# Patient Record
Sex: MALE | Race: WHITE | Employment: FULL TIME | URBAN - METROPOLITAN AREA
[De-identification: names, ages, dates, MRNs, and addresses within clinical notes are randomized per-mention and may not be internally consistent; named-entity substitution may affect disease eponyms.]

---

## 2017-07-20 ENCOUNTER — HOSPITAL ENCOUNTER (OUTPATIENT)
Age: 42
Discharge: HOME | End: 2017-07-20
Attending: PEDIATRICS
Payer: COMMERCIAL

## 2017-07-20 VITALS
TEMPERATURE: 99 F | SYSTOLIC BLOOD PRESSURE: 149 MMHG | DIASTOLIC BLOOD PRESSURE: 92 MMHG | BODY MASS INDEX: 23.7 KG/M2 | RESPIRATION RATE: 16 BRPM | HEART RATE: 107 BPM | WEIGHT: 175 LBS | HEIGHT: 72 IN | OXYGEN SATURATION: 100 %

## 2017-07-20 DIAGNOSIS — J06.9 UPPER RESPIRATORY TRACT INFECTION, UNSPECIFIED TYPE: Primary | ICD-10-CM

## 2017-07-20 LAB — S PYO AG THROAT QL: NEGATIVE

## 2017-07-20 PROCEDURE — 99203 OFFICE O/P NEW LOW 30 MIN: CPT

## 2017-07-20 PROCEDURE — 87430 STREP A AG IA: CPT

## 2017-07-20 RX ORDER — CODEINE PHOSPHATE AND GUAIFENESIN 10; 100 MG/5ML; MG/5ML
5 SOLUTION ORAL EVERY 6 HOURS PRN
Qty: 120 ML | Refills: 0 | Status: SHIPPED | OUTPATIENT
Start: 2017-07-20

## 2017-07-20 NOTE — ED INITIAL ASSESSMENT (HPI)
Cough, fever, chills, body aches, headaches, abdominal pain x 4 days. Children had the same symptoms.

## 2017-07-20 NOTE — ED PROVIDER NOTES
Patient presents with:  Cough/URI      HPI:     Reinaldo Murry is a 38year old male who presents for evaluation of a chief complaint of URI symptoms for 4 days. These include cough, congestion, sore throat, fevers, body aches. Positive sick contacts.   He medicine trial discussed. All results reviewed and discussed with patient. See AVS for detailed discharge instructions for your condition today. Follow Up with:  [facility] in [place] [address]

## 2024-12-24 ENCOUNTER — APPOINTMENT (OUTPATIENT)
Dept: CT IMAGING | Facility: HOSPITAL | Age: 49
End: 2024-12-24
Attending: EMERGENCY MEDICINE
Payer: COMMERCIAL

## 2024-12-24 ENCOUNTER — HOSPITAL ENCOUNTER (EMERGENCY)
Facility: HOSPITAL | Age: 49
Discharge: HOME | End: 2024-12-24
Attending: EMERGENCY MEDICINE
Payer: COMMERCIAL

## 2024-12-24 VITALS
OXYGEN SATURATION: 100 % | HEIGHT: 72 IN | DIASTOLIC BLOOD PRESSURE: 94 MMHG | WEIGHT: 175 LBS | RESPIRATION RATE: 15 BRPM | SYSTOLIC BLOOD PRESSURE: 142 MMHG | HEART RATE: 87 BPM | TEMPERATURE: 97 F | BODY MASS INDEX: 23.7 KG/M2

## 2024-12-24 DIAGNOSIS — N20.0 KIDNEY STONE: Primary | ICD-10-CM

## 2024-12-24 LAB
ALBUMIN SERPL-MCNC: 5.2 G/DL (ref 3.2–4.8)
ALBUMIN/GLOB SERPL: 2.3 {RATIO} (ref 1–2)
ALP LIVER SERPL-CCNC: 90 U/L
ALT SERPL-CCNC: 64 U/L
ANION GAP SERPL CALC-SCNC: 9 MMOL/L (ref 0–18)
AST SERPL-CCNC: 32 U/L (ref ?–34)
BASOPHILS # BLD AUTO: 0.05 X10(3) UL (ref 0–0.2)
BASOPHILS NFR BLD AUTO: 0.6 %
BILIRUB SERPL-MCNC: 0.8 MG/DL (ref 0.3–1.2)
BILIRUB UR QL: NEGATIVE
BUN BLD-MCNC: 11 MG/DL (ref 9–23)
BUN/CREAT SERPL: 10.6 (ref 10–20)
CALCIUM BLD-MCNC: 10.1 MG/DL (ref 8.7–10.4)
CHLORIDE SERPL-SCNC: 103 MMOL/L (ref 98–112)
CLARITY UR: CLEAR
CO2 SERPL-SCNC: 26 MMOL/L (ref 21–32)
CREAT BLD-MCNC: 1.04 MG/DL
DEPRECATED RDW RBC AUTO: 44.7 FL (ref 35.1–46.3)
EGFRCR SERPLBLD CKD-EPI 2021: 87 ML/MIN/1.73M2 (ref 60–?)
EOSINOPHIL # BLD AUTO: 0.15 X10(3) UL (ref 0–0.7)
EOSINOPHIL NFR BLD AUTO: 1.7 %
ERYTHROCYTE [DISTWIDTH] IN BLOOD BY AUTOMATED COUNT: 13.2 % (ref 11–15)
GLOBULIN PLAS-MCNC: 2.3 G/DL (ref 2–3.5)
GLUCOSE BLD-MCNC: 131 MG/DL (ref 70–99)
GLUCOSE UR-MCNC: NORMAL MG/DL
HCT VFR BLD AUTO: 44.5 %
HGB BLD-MCNC: 15.3 G/DL
IMM GRANULOCYTES # BLD AUTO: 0.05 X10(3) UL (ref 0–1)
IMM GRANULOCYTES NFR BLD: 0.6 %
KETONES UR-MCNC: NEGATIVE MG/DL
LEUKOCYTE ESTERASE UR QL STRIP.AUTO: NEGATIVE
LYMPHOCYTES # BLD AUTO: 1.78 X10(3) UL (ref 1–4)
LYMPHOCYTES NFR BLD AUTO: 20.5 %
MCH RBC QN AUTO: 31.3 PG (ref 26–34)
MCHC RBC AUTO-ENTMCNC: 34.4 G/DL (ref 31–37)
MCV RBC AUTO: 91 FL
MONOCYTES # BLD AUTO: 0.65 X10(3) UL (ref 0.1–1)
MONOCYTES NFR BLD AUTO: 7.5 %
NEUTROPHILS # BLD AUTO: 6.02 X10 (3) UL (ref 1.5–7.7)
NEUTROPHILS # BLD AUTO: 6.02 X10(3) UL (ref 1.5–7.7)
NEUTROPHILS NFR BLD AUTO: 69.1 %
NITRITE UR QL STRIP.AUTO: NEGATIVE
OSMOLALITY SERPL CALC.SUM OF ELEC: 287 MOSM/KG (ref 275–295)
PH UR: 6.5 [PH] (ref 5–8)
PLATELET # BLD AUTO: 233 10(3)UL (ref 150–450)
POTASSIUM SERPL-SCNC: 4 MMOL/L (ref 3.5–5.1)
PROT SERPL-MCNC: 7.5 G/DL (ref 5.7–8.2)
PROT UR-MCNC: 20 MG/DL
RBC # BLD AUTO: 4.89 X10(6)UL
RBC #/AREA URNS AUTO: >10 /HPF
SODIUM SERPL-SCNC: 138 MMOL/L (ref 136–145)
SP GR UR STRIP: 1.01 (ref 1–1.03)
UROBILINOGEN UR STRIP-ACNC: NORMAL
WBC # BLD AUTO: 8.7 X10(3) UL (ref 4–11)

## 2024-12-24 PROCEDURE — 96374 THER/PROPH/DIAG INJ IV PUSH: CPT

## 2024-12-24 PROCEDURE — 74176 CT ABD & PELVIS W/O CONTRAST: CPT | Performed by: EMERGENCY MEDICINE

## 2024-12-24 PROCEDURE — 96361 HYDRATE IV INFUSION ADD-ON: CPT

## 2024-12-24 PROCEDURE — 85025 COMPLETE CBC W/AUTO DIFF WBC: CPT | Performed by: EMERGENCY MEDICINE

## 2024-12-24 PROCEDURE — 99285 EMERGENCY DEPT VISIT HI MDM: CPT

## 2024-12-24 PROCEDURE — 99284 EMERGENCY DEPT VISIT MOD MDM: CPT

## 2024-12-24 PROCEDURE — 80053 COMPREHEN METABOLIC PANEL: CPT | Performed by: EMERGENCY MEDICINE

## 2024-12-24 PROCEDURE — 81001 URINALYSIS AUTO W/SCOPE: CPT | Performed by: EMERGENCY MEDICINE

## 2024-12-24 RX ORDER — HYDROCODONE BITARTRATE AND ACETAMINOPHEN 5; 325 MG/1; MG/1
1 TABLET ORAL EVERY 6 HOURS PRN
Qty: 16 TABLET | Refills: 0 | Status: SHIPPED | OUTPATIENT
Start: 2024-12-24 | End: 2024-12-31

## 2024-12-24 RX ORDER — KETOROLAC TROMETHAMINE 15 MG/ML
15 INJECTION, SOLUTION INTRAMUSCULAR; INTRAVENOUS ONCE
Status: COMPLETED | OUTPATIENT
Start: 2024-12-24 | End: 2024-12-24

## 2024-12-24 RX ORDER — TAMSULOSIN HYDROCHLORIDE 0.4 MG/1
0.4 CAPSULE ORAL DAILY
Qty: 14 CAPSULE | Refills: 0 | Status: SHIPPED | OUTPATIENT
Start: 2024-12-24 | End: 2025-01-07

## 2024-12-24 RX ORDER — ONDANSETRON 4 MG/1
4 TABLET, ORALLY DISINTEGRATING ORAL EVERY 4 HOURS PRN
Qty: 10 TABLET | Refills: 0 | Status: SHIPPED | OUTPATIENT
Start: 2024-12-24 | End: 2024-12-31

## 2024-12-24 RX ORDER — KETOROLAC TROMETHAMINE 10 MG/1
10 TABLET, FILM COATED ORAL EVERY 6 HOURS PRN
Qty: 25 TABLET | Refills: 0 | Status: SHIPPED | OUTPATIENT
Start: 2024-12-24 | End: 2024-12-31

## 2024-12-24 NOTE — ED QUICK NOTES
Pt c/o dull right sided back pain that radiates to front since last night. States woke him from his sleep- some relief with ibuprofen. Denies burning or pain with urination, +hematuria.

## 2024-12-24 NOTE — ED INITIAL ASSESSMENT (HPI)
Pt presents to ED with atraumatic right sided flank/lower back pain starting yesterday + hematuria. Denies known fevers or n/v

## 2024-12-24 NOTE — ED PROVIDER NOTES
Patient Seen in: [facility] Emergency Department    History     Chief Complaint   Patient presents with    Abdomen/Flank Pain       HPI    49-year-old male presents to the ED for evaluation of right flank pain that started early this morning followed by hematuria.  Patient states the pain was severe initially but now it is a very mild dull ache.  He had some nausea but no vomiting.  Patient states he felt fine going to bed last night.  No other exacerbating or relieving factors    History from Independent Source:       External Records Reviewed: On chart review, patient had his last primary care visit in August.  He is being treated for general anxiety, IBS, sleep apnea.  He had recent cataract surgery of 21 November    History reviewed. History reviewed. No pertinent past medical history.    History reviewed. History reviewed. No pertinent surgical history.      Medications :  Prescriptions Prior to Admission[1]     No family history on file.    Smoking Status:   Social History     Socioeconomic History    Marital status:    Tobacco Use    Smoking status: Never    Smokeless tobacco: Never   Substance and Sexual Activity    Alcohol use: Not Currently    Drug use: Not Currently       Constitutional and vital signs reviewed.      Social History and Family History elements reviewed from today, pertinent positives to the presenting problem noted.    Physical Exam     ED Triage Vitals [12/24/24 0938]   BP (!) 156/92   Pulse 99   Resp 15   Temp 96.8 °F (36 °C)   Temp src    SpO2 99 %   O2 Device None (Room air)       Physical Exam   Constitutional: AAOx3, well nourished, NAD  HEENT: Normocephalic, PERRLA, MMM  CV: s1s2+, RRR, no m/r/g, normal distal pulses  Pulmonary/Chest: CTA b/l with no rales, wheezes.  No chest wall tenderness  Abdominal: Nontender.  Nondistended. Soft. Bowel sounds are normal.   Neck/Back: No CVA tenderness percussion  :   Musculoskeletal: Normal range of motion. No deformity.    Neurological: Awake, alert. Normal reflexes. No cranial nerve deficit.    Skin: Skin is warm and dry. No rash noted. No erythema.   Psychiatric:      All measures to prevent infection transmission during my interaction with the patient were taken. The patient was already wearing a droplet mask on my arrival to the room. Personal protective equipment was worn throughout the duration of the exam.      ED Course        Labs Reviewed   URINALYSIS WITH CULTURE REFLEX - Abnormal; Notable for the following components:       Result Value    Blood Urine 3+ (*)     Protein Urine 20 (*)     RBC Urine >10 (*)     Bacteria Urine 1+ (*)     All other components within normal limits   COMP METABOLIC PANEL (14) - Abnormal; Notable for the following components:    Glucose 131 (*)     ALT 64 (*)     Albumin 5.2 (*)     A/G Ratio 2.3 (*)     All other components within normal limits   CBC WITH DIFFERENTIAL WITH PLATELET     My Independent Interpretation of EKG (if performed):     Imaging Results Available and Reviewed while in ED: CT ABDOMEN+PELVIS KIDNEYSTONE 2D RNDR(NO IV,NO ORAL)(CPT=74176)    Result Date: 12/24/2024  CONCLUSION:   1. Obstructing 6 mm calculus in the right mid ureter with resultant mild to moderate upstream right hydroureteronephrosis and mild right periureteral/perinephric fat stranding. 2. No bowel obstruction, acute appendicitis, abnormal bowel wall thickening, or acute diverticulitis. 3. Prostatomegaly. 4. Hepatic steatosis. 5. Lesser incidental findings described above.     Dictated by (CST): Tahir Rosa MD on 12/24/2024 at 10:53 AM     Finalized by (CST): Tahir Rosa MD on 12/24/2024 at 10:58 AM         ED Medications Administered:   Medications   ketorolac (Toradol) 15 MG/ML injection 15 mg (has no administration in time range)   sodium chloride 0.9 % IV bolus 1,000 mL (1,000 mL Intravenous New Bag 12/24/24 1034)             MDM     Vitals:    12/24/24 0938 12/24/24 1030   BP: (!) 156/92 134/86    Pulse: 99 97   Resp: 15    Temp: 96.8 °F (36 °C)    SpO2: 99% 100%   Weight: 79.4 kg    Height: 182.9 cm (6')      *I personally reviewed and interpreted all ED vitals.    Independent Interpretation of Studies: I have independently reviewed CT of the abdomen pelvis.  Patient has a right mid ureter kidney stone with associated hydro    Social Determinants of Health:     Procedures:      Differential/MDM/Shared Decision Making: Differential Diagnosis includes kidney stone, pyelonephritis, gastroenteritis, cholecystitis, appendicitis, others.      The patient already  has no past medical history on file.  to contribute to the complexity of this ED evaluation.           Medications, Diagnostics, or Disposition considered but not done:     Patient does have kidney stone with hematuria.  Pain is under good control at this time.  Will discharge on pain meds, Flomax, urology follow-up.  Management of case was discussed with patient and he is comfortable with discharge plan.      Condition upon leaving the department: Stable    Disposition and Plan     Clinical Impression:  1. Kidney stone        Disposition:  Discharge    Follow-up:  Ary Messer MD  [address] [id number] [phone]    Call today        Medications Prescribed:  Current Discharge Medication List        START taking these medications    Details   Ketorolac Tromethamine 10 MG Oral Tab Take 1 tablet (10 mg total) by mouth every 6 (six) hours as needed.  Qty: 25 tablet, Refills: 0    Associated Diagnoses: Kidney stone      HYDROcodone-acetaminophen 5-325 MG Oral Tab Take 1 tablet by mouth every 6 (six) hours as needed for Pain.  Qty: 16 tablet, Refills: 0    Associated Diagnoses: Kidney stone      ondansetron 4 MG Oral Tablet Dispersible Take 1 tablet (4 mg total) by mouth every 4 (four) hours as needed for Nausea.  Qty: 10 tablet, Refills: 0    Associated Diagnoses: Kidney stone      tamsulosin (FLOMAX) 0.4 MG Oral Cap Take 1  capsule (0.4 mg total) by mouth daily for 14 days.  Qty: 14 capsule, Refills: 0    Associated Diagnoses: Kidney stone                      [1] (Not in a hospital admission)

## 2024-12-26 ENCOUNTER — TELEPHONE (OUTPATIENT)
Dept: SURGERY | Facility: CLINIC | Age: 49
End: 2024-12-26

## 2024-12-26 NOTE — TELEPHONE ENCOUNTER
Patient called stated that he is an ER follow up and needs to be seen within the next week for kidney stones. Please call back.

## 2024-12-30 NOTE — TELEPHONE ENCOUNTER
Called patient - offered patient appointment Friday 1/3/2025 with PA.  Patient verbalizes he will be out of town and returning next week and available to make appointment for next week if needed.  Patient asking if appointment is still needed? Pain resolved, Denies blood in urine, feels like emptying bladder when urinates.  Patient asking if should still follow up with urologist?  Message forwarded to Stelal Brown PA-C

## 2025-01-09 NOTE — PROGRESS NOTES
Urology Clinic Note - New Patient    Referring Provider:  No referring provider defined for this encounter.     Primary Care Provider:  Scott Epps MD     Chief Complaint:   Kidney stone    HPI:     Chico Salter is a 49 year old male with history of NICOLAS, IBS, anxiety referred for kidney stone.     Came to ED on 12/24 with right flank pain.   CT:   1. Obstructing 6 mm calculus in the right mid ureter with resultant mild to moderate upstream right hydroureteronephrosis and mild right periureteral/perinephric fat stranding.  Large prostate  Labs, UA were normal.   Here now to [facility].  Patient states that he had pain for 2 days following his ER visit but subsequently has not had any pain.  Has not seen stone pass.  He denies any previous history of kidney stones.  Does have a family history of kidney stones.  Does okay with water intake.  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  He has no fevers, chills, nausea, vomiting.  No gross hematuria.  Mild urinary symptoms, occasionally has a weak stream.      PSA:  No results found for: \"PSA\", \"PERCENTPSA\", \"PSAS\", \"PSAULTRA\", \"QPSA\", \"PSATOT\", \"TOTPSADX\", \"TOTPSASCREEN\"   Last Cr was 1.04 done on 12/24/2024.      History:   No past medical history on file.    No past surgical history on file.    No family history on file.    Social History     Socioeconomic History    Marital status:    Tobacco Use    Smoking status: Never    Smokeless tobacco: Never   Substance and Sexual Activity    Alcohol use: Not Currently    Drug use: Not Currently     Social Drivers of Health     Food Insecurity: Unknown (9/13/2023)    Received from [facility]    Food Insecurity     Have there been times that your food ran out, and you didn't have money to get more?: Prefer Not to Answer   Transportation Needs: Unknown (9/13/2023)    Received from [facility]    Transportation Needs     Do you have trouble  getting transportation to medical appointments?: Prefer Not to Answer       Medications (Active prior to today's visit):  Current Outpatient Medications   Medication Sig Dispense Refill    guaiFENesin-codeine 100-10 MG/5ML Oral Solution Take 5 mL by mouth every 6 (six) hours as needed for cough. 120 mL 0       Allergies:  Allergies[1]      Review of Systems:   A comprehensive 10-point review of systems was completed.  Pertinent positives and negatives are noted in the the HPI.    Physical Exam:   CONSTITUTIONAL: Well developed, well nourished, in no acute distress  NEUROLOGIC: Alert and oriented  HEAD: Normocephalic, atraumatic  ENT: Hearing intact   RESPIRATORY: Normal respiratory effort  SKIN: No evident rashes  ABDOMEN: Soft, non-tender, non-distended, no CVA tenderness    CT ABDOMEN+PELVIS KIDNEYSTONE 2D RNDR(NO IV,NO ORAL)(CPT=74176)    Result Date: 12/24/2024  CONCLUSION:   1. Obstructing 6 mm calculus in the right mid ureter with resultant mild to moderate upstream right hydroureteronephrosis and mild right periureteral/perinephric fat stranding. 2. No bowel obstruction, acute appendicitis, abnormal bowel wall thickening, or acute diverticulitis. 3. Prostatomegaly. 4. Hepatic steatosis. 5. Lesser incidental findings described above.     Dictated by (CST): Tahir Rosa MD on 12/24/2024 at 10:53 AM     Finalized by (CST): Tahir Rosa MD on 12/24/2024 at 10:58 AM             Assessment & Plan:   Kidney stone  Discussed patient's history in detail.  Patient had a mid ureteral stone on the right side.  His pain is since resolved.  Unclear if patient passed out as he did not catch it.  Given stone is in the mid ureter may be hard to tell if it is passed on ultrasound, we discussed the option of performing a CT scan with low-dose protocol.  Will proceed with this.  Will plan for CT scan in about 2 to 3 weeks.  Will do a telehealth visit in 4 weeks.  If patient's stone remains, we will likely plan for  ureteroscopy.  Continue urine straining, push fluids.   Refilled fomax rx, he states this also helped him with his stream.   Return to ER precautions given.     I discussed general fluid and dietary guidelines to help prevent further stone formation including:    - Fluid consumption of preferably water to make 2-2.5  L/day of urine  - Low sodium consumption  - Adequate calcium consumption (approximately 1200 mg/day)  - Low fat and moderate animal protein consumption  - Limit consumption  of oxalate rich foods   - I encouraged increased dietary intake of citrate with lemon juice (4 oz day)     LUTS-  Flomax as above; discussed s/e profile    Thank you for this consult.    I have personally reviewed all relevant medical records, labs, and imaging.       Randall Beatty MD  Staff Urologist  [facility]  Office: [phone]           [1] No Known Allergies

## 2025-01-15 ENCOUNTER — OFFICE VISIT (OUTPATIENT)
Dept: SURGERY | Facility: CLINIC | Age: 50
End: 2025-01-15
Payer: COMMERCIAL

## 2025-01-15 DIAGNOSIS — N20.0 KIDNEY STONES: Primary | ICD-10-CM

## 2025-01-15 DIAGNOSIS — N40.1 BENIGN PROSTATIC HYPERPLASIA WITH LOWER URINARY TRACT SYMPTOMS, SYMPTOM DETAILS UNSPECIFIED: ICD-10-CM

## 2025-01-15 LAB
APPEARANCE: CLEAR
BILIRUBIN: NEGATIVE
GLUCOSE (URINE DIPSTICK): NEGATIVE MG/DL
KETONES (URINE DIPSTICK): NEGATIVE MG/DL
LEUKOCYTES: NEGATIVE
MULTISTIX LOT#: NORMAL NUMERIC
NITRITE, URINE: NEGATIVE
OCCULT BLOOD: NEGATIVE
PH, URINE: 6.5 (ref 4.5–8)
PROTEIN (URINE DIPSTICK): NEGATIVE MG/DL
SPECIFIC GRAVITY: 1.02 (ref 1–1.03)
URINE-COLOR: YELLOW
UROBILINOGEN,SEMI-QN: 0.2 MG/DL (ref 0–1.9)

## 2025-01-15 PROCEDURE — 81003 URINALYSIS AUTO W/O SCOPE: CPT | Performed by: UROLOGY

## 2025-01-15 PROCEDURE — 99204 OFFICE O/P NEW MOD 45 MIN: CPT | Performed by: UROLOGY

## 2025-01-15 RX ORDER — AZELASTINE HCL 205.5 UG/1
SPRAY NASAL
COMMUNITY
Start: 2024-01-01

## 2025-01-15 RX ORDER — FLUOROURACIL 50 MG/ML
SOLUTION TOPICAL
COMMUNITY
Start: 2024-08-21

## 2025-01-15 RX ORDER — CETIRIZINE HYDROCHLORIDE 10 MG/1
10 TABLET, CHEWABLE ORAL DAILY
COMMUNITY

## 2025-01-15 RX ORDER — CITALOPRAM HYDROBROMIDE 40 MG/1
40 TABLET ORAL DAILY
COMMUNITY

## 2025-01-15 RX ORDER — TAMSULOSIN HYDROCHLORIDE 0.4 MG/1
0.4 CAPSULE ORAL EVERY EVENING
Qty: 90 CAPSULE | Refills: 6 | Status: SHIPPED | OUTPATIENT
Start: 2025-01-15

## 2025-01-15 RX ORDER — ALPRAZOLAM 0.25 MG/1
1 TABLET ORAL 3 TIMES DAILY PRN
COMMUNITY
Start: 2024-12-02

## 2025-01-15 RX ORDER — DIPHENOXYLATE HYDROCHLORIDE AND ATROPINE SULFATE 2.5; .025 MG/1; MG/1
TABLET ORAL
COMMUNITY
Start: 2014-05-04

## 2025-02-15 ENCOUNTER — HOSPITAL ENCOUNTER (OUTPATIENT)
Dept: CT IMAGING | Facility: HOSPITAL | Age: 50
Discharge: HOME | End: 2025-02-15
Attending: PHYSICIAN ASSISTANT
Payer: COMMERCIAL

## 2025-02-15 DIAGNOSIS — N20.1 URETERAL STONE: ICD-10-CM

## 2025-02-15 PROCEDURE — 74176 CT ABD & PELVIS W/O CONTRAST: CPT | Performed by: PHYSICIAN ASSISTANT

## 2025-02-19 ENCOUNTER — TELEMEDICINE (OUTPATIENT)
Dept: SURGERY | Facility: CLINIC | Age: 50
End: 2025-02-19

## 2025-02-19 ENCOUNTER — TELEPHONE (OUTPATIENT)
Dept: SURGERY | Facility: CLINIC | Age: 50
End: 2025-02-19

## 2025-02-19 DIAGNOSIS — N20.0 KIDNEY STONE: Primary | ICD-10-CM

## 2025-02-19 DIAGNOSIS — N21.0 BLADDER STONE: ICD-10-CM

## 2025-02-19 DIAGNOSIS — N28.1 RENAL CYST: ICD-10-CM

## 2025-02-19 PROCEDURE — 98006 SYNCH AUDIO-VIDEO EST MOD 30: CPT | Performed by: UROLOGY

## 2025-02-19 NOTE — PROGRESS NOTES
Urology Clinic Note  Telemedicine Visit  Audio & Video      Chico Salter verbally consents to a Virtual Video Check-In service today.  This is a telemedicine visit with live, interactive video and audio.   Patient understands and accepts financial responsibility for any deductible, co-insurance and/or co-pays associated with this service.    Duration of the service including review of pertinent imaging/labs and coordination of care: 30 minutes    Summary of topics discussed:  See below    Primary Care Provider:  Scott Epps MD     Chief Complaint:   Kidney stones     HPI:     Chico Salter is a 49 year old male with history of NICOLAS, IBS, anxiety referred for kidney stone.      Came to ED on 12/24 with right flank pain.   CT:   1. Obstructing 6 mm calculus in the right mid ureter with resultant mild to moderate upstream right hydroureteronephrosis and mild right periureteral/perinephric fat stranding.  Large prostate  Labs, UA were normal.   He was on medical expulsive therapy.  He saw me for follow-up on 1/15/2025.    He denies any previous history of kidney stones.  Does have a family history of kidney stones.  Does okay with water intake.  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  He has no fevers, chills, nausea, vomiting.  No gross hematuria.  Mild urinary symptoms, occasionally has a weak stream.     At that time I offered patient surgery.  He preferred ongoing observation. A CT scan was repeated on 2/15/2025.  Stone has now passed, is in the bladder.  Other stones noted.  No residual hydronephrosis.  BPH noted.  Renal cysts noted.    Patient here via video call to discuss   Feels well.  No gross hematuria.  Has not passed stone.  No pain.        PSA:  No results found for: \"PSA\", \"PERCENTPSA\", \"PSAS\", \"PSAULTRA\"     History:   No past medical history on file.    No past surgical history on file.    No family history on file.    Social History     Socioeconomic History    Marital  status:    Tobacco Use    Smoking status: Never    Smokeless tobacco: Never   Substance and Sexual Activity    Alcohol use: Not Currently    Drug use: Not Currently     Social Drivers of Health     Food Insecurity: Unknown (9/13/2023)    Received from [facility]    Food Insecurity     Have there been times that your food ran out, and you didn't have money to get more?: Prefer Not to Answer   Transportation Needs: Unknown (9/13/2023)    Received from [facility]    Transportation Needs     Do you have trouble getting transportation to medical appointments?: Prefer Not to Answer       Medications (Active prior to today's visit):  Current Outpatient Medications   Medication Sig Dispense Refill    ALPRAZolam 0.25 MG Oral Tab Take 1 tablet (0.25 mg total) by mouth 3 (three) times daily as needed. (Patient not taking: Reported on 1/15/2025)      Azelastine HCl (ASTEPRO) 0.15 % Nasal Solution       Cetirizine HCl 10 MG Oral Chew Tab Chew 1 tablet (10 mg total) by mouth daily.      citalopram 40 MG Oral Tab Take 1 tablet (40 mg total) by mouth daily.      fluorouracil 5 % External Solution WartPEEL (5-FU 2%/Sal Acid 17%) in Remedium TOP soln <COMPOUNDED>. To warts daily until clear.      Multiple Vitamin (THERA/BETA-CAROTENE) Oral Tab Take by mouth.      tamsulosin 0.4 MG Oral Cap Take 1 capsule (0.4 mg total) by mouth every evening. 90 capsule 6       Allergies:  Allergies[1]    Review of Systems:   A comprehensive 10-point review of systems was completed.  Pertinent positives and negatives are noted in the the HPI.    Physical Exam:   CONSTITUTIONAL: Well developed, well nourished, in no acute distress  NEUROLOGIC: Alert and oriented, normal speech  EYES: Sclera non-icteric  HEAD: Normocephalic, atraumatic  ENT: Hearing intact  RESPIRATORY: Normal respiratory effort    Assessment & Plan:     Nephrolithiasis  Bladder stone  LUTS  Patient passed kidney stone.  Now sitting in  bladder.  Patient has considerable BPH, discussed increasing fluid to try to pass stone.  He will stay on Flomax as well due to his mild to moderate LUTS.  He does believe that the Flomax is helping his voiding.    We will repeat an ultrasound in about 6 weeks to make sure the stone has passed, if it has not we will discuss in office cystoscopy with attempted stone removal versus OR.    Kidney cysts  Further characterized on ultrasound.  If needed, will plan for contrast-enhanced imaging.      In total, 30 minutes were spent on this patient encounter (including chart review, patient history, physical, and counseling, documentation, and communication).         Randall Beatty MD  Staff Urologist  [facility]  Office: [phone]             [1] No Known Allergies

## 2025-03-28 ENCOUNTER — HOSPITAL ENCOUNTER (OUTPATIENT)
Dept: ULTRASOUND IMAGING | Facility: HOSPITAL | Age: 50
Discharge: HOME | End: 2025-03-28
Attending: UROLOGY
Payer: COMMERCIAL

## 2025-03-28 DIAGNOSIS — N20.0 KIDNEY STONE: ICD-10-CM

## 2025-03-28 DIAGNOSIS — N21.0 BLADDER STONE: ICD-10-CM

## 2025-03-28 DIAGNOSIS — N28.1 RENAL CYST: ICD-10-CM

## 2025-03-28 PROCEDURE — 76770 US EXAM ABDO BACK WALL COMP: CPT | Performed by: UROLOGY

## 2025-04-03 ENCOUNTER — TELEMEDICINE (OUTPATIENT)
Dept: SURGERY | Facility: CLINIC | Age: 50
End: 2025-04-03
Payer: COMMERCIAL

## 2025-04-03 DIAGNOSIS — N20.0 KIDNEY STONES: ICD-10-CM

## 2025-04-03 DIAGNOSIS — N28.1 RENAL CYST: Primary | ICD-10-CM

## 2025-04-03 PROCEDURE — 98006 SYNCH AUDIO-VIDEO EST MOD 30: CPT | Performed by: UROLOGY

## 2025-04-03 NOTE — PROGRESS NOTES
Urology Clinic Note  Telemedicine Visit  Audio & Video      Chico Salter verbally consents to a Virtual Video Check-In service today.  This is a telemedicine visit with live, interactive video and audio.   Patient understands and accepts financial responsibility for any deductible, co-insurance and/or co-pays associated with this service.    Duration of the service including review of pertinent imaging/labs and coordination of care: 30 minutes    Summary of topics discussed:  See below    Primary Care Provider:  Scott Epps MD     Chief Complaint:   Kidney stone    HPI:       Chico Salter is a 50 year old male with history of NICOLAS, IBS, anxiety referred for kidney stone.      Came to ED on 12/24 with right flank pain.   CT:   1. Obstructing 6 mm calculus in the right mid ureter with resultant mild to moderate upstream right hydroureteronephrosis and mild right periureteral/perinephric fat stranding.  Large prostate  Labs, UA were normal.   He was on medical expulsive therapy.  He saw me for follow-up on 1/15/2025.     He denies any previous history of kidney stones.  Does have a family history of kidney stones.  Does okay with water intake.  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  He has no fevers, chills, nausea, vomiting.  No gross hematuria.  Mild urinary symptoms, occasionally has a weak stream.     At that time I offered patient surgery.  He preferred ongoing observation. A CT scan was repeated on 2/15/2025.  Stone had passed and was then in the bladder.  I saw him for video visit after that to discuss.     Patient here via video call to discuss we decided to obtain a ultrasound in the future to make sure the stone had passed.  Also to further characterize his renal cyst as he has a family history of RCC.  He was to continue Flomax for his LUTS.  He returns now with ultrasound.  This was done on 3/28/2025.  This shows no hydronephrosis.  No bladder stones.  Cyst in the right  kidney was visualized, left-sided kidney cyst was not seen on this exam, CT scan was suggested for 6 to 12 months to assess for stability.  Feels well.  No gross hematuria.  Has not seen stone pass.  Has no symptoms to suggest stone remains.        PSA:  No results found for: \"PSA\", \"PERCENTPSA\", \"PSAS\", \"PSAULTRA\"     History:   No past medical history on file.    No past surgical history on file.    No family history on file.    Social History     Socioeconomic History    Marital status:    Tobacco Use    Smoking status: Never    Smokeless tobacco: Never   Substance and Sexual Activity    Alcohol use: Not Currently    Drug use: Not Currently     Social Drivers of Health     Food Insecurity: Unknown (9/13/2023)    Received from [facility]    Food Insecurity     Have there been times that your food ran out, and you didn't have money to get more?: Prefer Not to Answer   Transportation Needs: Unknown (9/13/2023)    Received from [facility]    Transportation Needs     Do you have trouble getting transportation to medical appointments?: Prefer Not to Answer       Medications (Active prior to today's visit):  Current Outpatient Medications   Medication Sig Dispense Refill    ALPRAZolam 0.25 MG Oral Tab Take 1 tablet (0.25 mg total) by mouth 3 (three) times daily as needed. (Patient not taking: Reported on 1/15/2025)      Azelastine HCl (ASTEPRO) 0.15 % Nasal Solution       Cetirizine HCl 10 MG Oral Chew Tab Chew 1 tablet (10 mg total) by mouth daily.      citalopram 40 MG Oral Tab Take 1 tablet (40 mg total) by mouth daily.      fluorouracil 5 % External Solution WartPEEL (5-FU 2%/Sal Acid 17%) in Remedium TOP soln <COMPOUNDED>. To warts daily until clear.      Multiple Vitamin (THERA/BETA-CAROTENE) Oral Tab Take by mouth.      tamsulosin 0.4 MG Oral Cap Take 1 capsule (0.4 mg total) by mouth every evening. 90 capsule 6       Allergies:  Allergies[1]    Review of  Systems:   A comprehensive 10-point review of systems was completed.  Pertinent positives and negatives are noted in the the HPI.    Physical Exam:   CONSTITUTIONAL: Well developed, well nourished, in no acute distress  NEUROLOGIC: Alert and oriented, normal speech  EYES: Sclera non-icteric  HEAD: Normocephalic, atraumatic  ENT: Hearing intact  RESPIRATORY: Normal respiratory effort    Assessment & Plan:     LUTS   Continue flomax, behavioral modification    Renal cyst  Appear stable; not completely characterized.  Given his history of RCC we did discuss obtaining contrast-enhanced imaging at some point.  We will do that for his follow-up.    Kidney stones  Appears to have passed stone.  Ultrasound shows no remaining stone kidney or bladder.  He has no other symptoms to suggest ongoing stone.  Discussed metabolic workup, will hold off for now given first-time stone former.     Repeat imaging in ~6-9 mo as above  I discussed general fluid and dietary guidelines to help prevent further stone formation including:    - Fluid consumption of preferably water to make 2-2.5  L/day of urine  - Low sodium consumption  - Adequate calcium consumption (approximately 1200 mg/day)  - Low fat and moderate animal protein consumption  - Limit consumption  of oxalate rich foods   - I encouraged increased dietary intake of citrate with lemon juice (4 oz day)           In total, 30 minutes were spent on this patient encounter (including chart review, patient history, physical, and counseling, documentation, and communication).           Randall Beatty MD  Staff Urologist  [facility]  Office: [phone]             [1] No Known Allergies

## 2025-07-07 ENCOUNTER — PATIENT MESSAGE (OUTPATIENT)
Dept: SURGERY | Facility: CLINIC | Age: 50
End: 2025-07-07

## 2025-07-07 DIAGNOSIS — R82.90 URINE FINDING: Primary | ICD-10-CM

## 2025-07-09 ENCOUNTER — LAB ENCOUNTER (OUTPATIENT)
Dept: LAB | Age: 50
End: 2025-07-09
Attending: UROLOGY
Payer: COMMERCIAL

## 2025-07-09 DIAGNOSIS — R82.90 URINE FINDING: ICD-10-CM

## 2025-07-09 LAB
BILIRUB UR QL: NEGATIVE
CLARITY UR: CLEAR
COLOR UR: YELLOW
GLUCOSE UR-MCNC: NORMAL MG/DL
HGB UR QL STRIP.AUTO: NEGATIVE
KETONES UR-MCNC: NEGATIVE MG/DL
LEUKOCYTE ESTERASE UR QL STRIP.AUTO: NEGATIVE
NITRITE UR QL STRIP.AUTO: NEGATIVE
PH UR: 6 [PH] (ref 5–8)
SP GR UR STRIP: 1.03 (ref 1–1.03)
UROBILINOGEN UR STRIP-ACNC: NORMAL

## 2025-07-09 PROCEDURE — 81003 URINALYSIS AUTO W/O SCOPE: CPT

## 2025-08-11 ENCOUNTER — PATIENT MESSAGE (OUTPATIENT)
Dept: SURGERY | Facility: CLINIC | Age: 50
End: 2025-08-11

## 2025-08-20 ENCOUNTER — HOSPITAL ENCOUNTER (OUTPATIENT)
Dept: CT IMAGING | Facility: HOSPITAL | Age: 50
Discharge: HOME | End: 2025-08-20
Attending: UROLOGY

## 2025-08-20 DIAGNOSIS — N20.0 KIDNEY STONES: ICD-10-CM

## 2025-08-20 DIAGNOSIS — N28.1 RENAL CYST: ICD-10-CM

## 2025-08-20 LAB
CREAT BLD-MCNC: 0.9 MG/DL (ref 0.7–1.3)
EGFRCR SERPLBLD CKD-EPI 2021: 103 ML/MIN/1.73M2 (ref 60–?)

## 2025-08-20 PROCEDURE — 82565 ASSAY OF CREATININE: CPT

## 2025-08-20 PROCEDURE — 74178 CT ABD&PLV WO CNTR FLWD CNTR: CPT | Performed by: UROLOGY

## 2025-08-21 ENCOUNTER — RESULTS FOLLOW-UP (OUTPATIENT)
Dept: SURGERY | Facility: CLINIC | Age: 50
End: 2025-08-21

## 2025-08-22 ENCOUNTER — PATIENT MESSAGE (OUTPATIENT)
Dept: SURGERY | Facility: CLINIC | Age: 50
End: 2025-08-22